# Patient Record
Sex: MALE | Race: BLACK OR AFRICAN AMERICAN | URBAN - METROPOLITAN AREA
[De-identification: names, ages, dates, MRNs, and addresses within clinical notes are randomized per-mention and may not be internally consistent; named-entity substitution may affect disease eponyms.]

---

## 2018-04-25 ENCOUNTER — HISTORICAL (OUTPATIENT)
Dept: RADIOLOGY | Facility: HOSPITAL | Age: 23
End: 2018-04-25

## 2018-05-01 ENCOUNTER — HISTORICAL (OUTPATIENT)
Dept: RADIOLOGY | Facility: HOSPITAL | Age: 23
End: 2018-05-01

## 2018-09-13 ENCOUNTER — HISTORICAL (OUTPATIENT)
Dept: RADIOLOGY | Facility: HOSPITAL | Age: 23
End: 2018-09-13

## 2018-12-20 ENCOUNTER — HISTORICAL (OUTPATIENT)
Dept: PREADMISSION TESTING | Facility: HOSPITAL | Age: 23
End: 2018-12-20

## 2018-12-20 ENCOUNTER — HISTORICAL (OUTPATIENT)
Dept: ADMINISTRATIVE | Facility: HOSPITAL | Age: 23
End: 2018-12-20

## 2018-12-20 LAB
ABS NEUT (OLG): 3.41 X10(3)/MCL (ref 2.1–9.2)
BASOPHILS # BLD AUTO: 0.1 X10(3)/MCL (ref 0–0.2)
BASOPHILS NFR BLD AUTO: 1 %
BUN SERPL-MCNC: 11 MG/DL (ref 7–18)
CALCIUM SERPL-MCNC: 9.3 MG/DL (ref 8.5–10.1)
CHLORIDE SERPL-SCNC: 104 MMOL/L (ref 98–107)
CO2 SERPL-SCNC: 28 MMOL/L (ref 21–32)
CREAT SERPL-MCNC: 1.13 MG/DL (ref 0.7–1.3)
CREAT/UREA NIT SERPL: 9.7
EOSINOPHIL # BLD AUTO: 0.2 X10(3)/MCL (ref 0–0.9)
EOSINOPHIL NFR BLD AUTO: 4 %
ERYTHROCYTE [DISTWIDTH] IN BLOOD BY AUTOMATED COUNT: 12 % (ref 11.5–17)
GLUCOSE SERPL-MCNC: 76 MG/DL (ref 74–106)
HCT VFR BLD AUTO: 44.7 % (ref 42–52)
HGB BLD-MCNC: 14.7 GM/DL (ref 14–18)
LYMPHOCYTES # BLD AUTO: 1.8 X10(3)/MCL (ref 0.6–4.6)
LYMPHOCYTES NFR BLD AUTO: 30 %
MCH RBC QN AUTO: 30.9 PG (ref 27–31)
MCHC RBC AUTO-ENTMCNC: 32.9 GM/DL (ref 33–36)
MCV RBC AUTO: 93.9 FL (ref 80–94)
MONOCYTES # BLD AUTO: 0.5 X10(3)/MCL (ref 0.1–1.3)
MONOCYTES NFR BLD AUTO: 8 %
NEUTROPHILS # BLD AUTO: 3.41 X10(3)/MCL (ref 2.1–9.2)
NEUTROPHILS NFR BLD AUTO: 57 %
PLATELET # BLD AUTO: 310 X10(3)/MCL (ref 130–400)
PMV BLD AUTO: 9.2 FL (ref 9.4–12.4)
POTASSIUM SERPL-SCNC: 4.3 MMOL/L (ref 3.5–5.1)
RBC # BLD AUTO: 4.76 X10(6)/MCL (ref 4.7–6.1)
SODIUM SERPL-SCNC: 142 MMOL/L (ref 136–145)
WBC # SPEC AUTO: 6 X10(3)/MCL (ref 4.5–11.5)

## 2018-12-21 ENCOUNTER — HISTORICAL (OUTPATIENT)
Dept: SURGERY | Facility: HOSPITAL | Age: 23
End: 2018-12-21

## 2022-04-11 ENCOUNTER — HISTORICAL (OUTPATIENT)
Dept: ADMINISTRATIVE | Facility: HOSPITAL | Age: 27
End: 2022-04-11

## 2022-04-27 VITALS
DIASTOLIC BLOOD PRESSURE: 75 MMHG | WEIGHT: 206.81 LBS | SYSTOLIC BLOOD PRESSURE: 117 MMHG | HEIGHT: 70 IN | BODY MASS INDEX: 29.61 KG/M2

## 2022-04-30 NOTE — OP NOTE
DATE OF SURGERY:    12/21/2018    SURGEON:  Bar Lee MD    PREOPERATIVE DIAGNOSIS:  Left knee lateral meniscus tear.    POSTOPERATIVE DIAGNOSIS:  Left knee lateral meniscus tear.    PROCEDURE:    1. Left knee arthroscopy with lateral meniscectomy, anterior horn, left knee.  2. Limited debridement left knee arthroscopy redundant fat pad.    ANESTHESIA:  LMA.    IV FLUIDS:  Per Anesthesia.    ESTIMATED BLOOD LOSS:  Less than 5 cc.    COUNTS:  Correct.    COMPLICATIONS:  None; stable to PACU.    INDICATION FOR PROCEDURE:  Mr. Blancas is a 23-year-old male with continued pain and loss of motion of his left knee.  He has failed multiple conservative treatments.  The patient had 2 MRIs demonstrating the above findings.  The risks, benefits and alternatives were discussed with the patient in detail.  The risks included, but were not limited to, pain, bleeding, infection, damage to blood vessels or nerves, heart attack, stroke, death, need for operation, continued pain, loss of motion, scar tissue, recurrent tear, posttraumatic arthritis, need for additional surgery, continued pain and loss of motion.  The patient understood these risks and wanted to proceed with surgical intervention.  Informed consent was obtained.    PROCEDURE IN DETAIL:  The patient was found in preoperative holding by Anesthesia and found fit for surgery.  He was taken to the operating room and placed on the operating table in supine position.  All bony prominences were well padded.  Timeout was called to identify correct patient, correct procedure and correct site, and all were in agreement.  The patient underwent LMA anesthesia without complications.  He was then prepped and draped in normal sterile fashion leaving the left lower extremity exposed for surgery.  Well-padded tourniquet was placed on the left upper thigh.  Approximate tourniquet time was 20 minutes at 300.  He received his preop antibiotics.  After exsanguination of left  lower extremity, tourniquet was inflated.  Anterolateral portal was then made.  Through a 1 cm incision, camera was introduced in suprapatellar pouch.  Next, the medial gutter was inspected with no loose bodies.  The patella was tracking appropriately.  There was no obvious cartilage change of the patellofemoral joint.  Camera was introduced the medial compartment.  Anteromedial portal was then made through 1 cm incision.  Next, probing the medial meniscus demonstrated some mild degeneration, otherwise no tear.  It was stable to stressing.  The probe demonstrated no tears as well.  His cartilage looked appropriate.  Camera was introduced in intercondylar notch where patient had a redundant and scarred in fat pad.  With use of 3.5 shaver, the patient underwent a debridement of this fat pad and scar tissue.  The ACL and PCL were inspected and found to be intact.  Camera was introduced into the lateral compartment where patient had a small anterior horn lateral meniscus tear.  It was unstable to the probed.  With use of 3.5 shaver, patient underwent debridement of the anterior horn of lateral meniscus.  The remaining lateral meniscus was then probed and found to be stable without any signs of additional tear or instability.  He had no gross changes of his cartilage of the lateral compartment as well.  After this was done, camera was introduced back in suprapatellar pouch.  All excess fluid was removed.  Tourniquet was released and hemostasis achieved.  Copious irrigation was used to wash the 2 incisions.  Incisions were then closed with 2-0 nylon sutures in a standard horizontal mattress configuration.  Xeroform, 4 x 4's, soft tissue dressing and Ace wrap were placed on the left lower extremity.  He was awoken by Anesthesia and brought to PACU in stable condition.        ______________________________  MD AUGUSTINE Johnson/LEROY  DD:  12/25/2018  Time:  12:54PM  DT:  12/25/2018  Time:  02:06PM  Job #:  [id number]

## 2022-05-04 NOTE — HISTORICAL [facility]ER
This is a historical note converted from [place]. Formatting and pictures may have been removed.  Please reference [employer] for original formatting and attached multimedia. TAKE THE FOLLOWING MEDICATIONS MORNING OF SURGERY:  ?   bp meds  ?   DO NOT take the following medications morning of surgery:  ?   [ ]  ?  STOP the following medications by:  ?  [ ]  ?Medication List  ?? Active Medications  ?? ? ??Prescribed  ?? ? ? ? ? dicyclomine: 20 mg, 2 cap(s), Oral, QID, 30 to 60 minutes before  ?? ? ? ? ? ? meals, PRN: as needed for abdominal cramps and spasms, 20 cap(s), 0  ?? ? ? ? ? ? Refill(s).  ?? ? ? ? ? loratadine: 10 mg, 1 tab(s), Oral, Daily, 30 tab(s), 3 Refill(s).  ?? ? ? ? ? montelukast: 10 mg, 1 tab(s), Oral, Daily, 30 tab(s), 3 Refill(s).  ?? ? ? ? ? ondansetron: 4 mg, 1 tab(s), Oral, TID, allow tablet to dissolve on  ?? ? ? ? ? ? tongue, PRN: as needed for nausea/vomiting, 12 tab(s), 0 Refill(s).  ?? ? ? ? ? sucralfate: 1 gm, 1 tab(s), Oral, QIDACHS, for 30 day(s), on an empty  ?? ? ? ? ? ? stomach, 120 tab(s), 3 Refill(s).  ?? ? ??Documented  ?? ? ? ? ? azithromycin: 250 mg, 1 tab(s), Oral, As Directed.  ?? ? ? ? ? epinephrine: .  ?? ? ? ? ? ergocalciferol: 81798 IntUnit, 1 cap(s), Oral, qWeek.  ?? ? ? ? ? escitalopram: 10 mg, 1 tab(s), Oral, Daily, 30 tab(s), 0 Refill(s).  ?? ? ? ? ? multivitamin with minerals: 1 tab(s), Oral, Daily, 0 Refill(s).  ?? Medications Inactivated in the Last 72 Hours  ?? ? ? venlafaxine: .  ?

## 2022-05-04 NOTE — HISTORICAL [facility]ER
This is a historical note converted from [employer]. Formatting and pictures may have been removed.  Please reference [employer] for original formatting and attached multimedia. Chief Complaint  Pre Op left knee-also c/o right knee pain  History of Present Illness  Mr. Blancas presents today preoperative evaluation for left knee arthroscopy with lateral meniscectomy. I reviewed the indications for surgery. The risks and benefits of the proposed and alternative treatments were discussed with the patient. Questions pertinent to the procedure were solicited and answered. No assurances were given. Informed consent was obtained. The patient expressed good understanding and wished to proceed with scheduling the procedure.  Review of Systems  Constitutional: No fever, weakness, or fatigue.  Ear/Nose/Mouth/Throat: No nasal congestion or sore throat.  Respiratory: No shortness of breath or cough.  Cardiovascular: No chest pain, palpitations, or peripheral edema.  Gastrointestinal: No nausea, vomiting, or abdominal pain.  Genitourinary: No dysuria.  Musculoskeletal:?Left knee pain swelling loss of motion  Integumentary: Negative.  Physical Exam  Vitals & Measurements  HR:?88(Peripheral)? BP:?117/75? WT:?93.44?kg? WT:?93.44?kg?  Appearance: No distress, good color on room air. Alert and cooperative.  HEENT: Normocephalic. PERRLA EOM intact. Nose and throat clear.  Lungs: Clear to auscultation and percussion.  Heart: Regular rate and rhythm without murmurs or gallops.  Abdomen: Soft non tender. Bowel sounds positive. No rebound tenderness.  Extremities:?Left lower extremity compartments are soft and warm. ?Skin is intact. ?He remains to be point tender along the lateral joint line positive lateral [place]. ?He is using his brace he does walk with a slight antalgic gait?he has 5 degrees short of full extension he is able to flex to 150 degrees?he is otherwise stable to stressing he is neurovascular intact distally.  Skin: No rashes or  open wounds.  Neuro: No focal weakness, loss of sensation or incoordination. Gait and station are normal.  DTRs equal.  Other:  Assessment/Plan  1.?Tear of lateral meniscus of left knee?S83.282A  Plan for left knee arthroscopy with lateral meniscectomy at [facility]. The patient has been given preoperative instructions and prescriptions for post-operative medication. Post-operative appointment is scheduled for 2 weeks.  ?  2.?Left knee pain?M25.562  ?  3.?Right knee pain?M25.561,? Right knee pain?M25.561  Ordered:  XR Knee Right 4 or More Views, Routine, 12/20/18 11:21:00 CST, Pain, None, Ambulatory, Rad Type, Right knee pain, Not Scheduled, 12/20/18 11:21:00 CST  ?   Problem List/Past Medical History  Ongoing  Abnormality of globulin(  Confirmed  )  Allergic rhinitis(  Confirmed  )  Cigarette smoker(  Confirmed  )  Classic migraine(  Confirmed  )  Depression  Emotional stress reaction(  Confirmed  )  Lack of sensation(  Confirmed  )  Liver enzyme elevation(  Confirmed  )  Supraspinatus tendonitis(  Confirmed  )  Tinnitus  Torn meniscus  Historical  Pervasive developmental disorder, residual(  Confirmed  )  Medications  Inpatient  No active inpatient medications  Home  AZITHROMYCIN 250MG TABLETS 6-LEVI, 250 mg= 1 tab(s), Oral, As Directed  Bentyl 10 mg oral capsule, 20 mg= 2 cap(s), Oral, QID, PRN  Carafate 1 g oral tablet, 1 gm= 1 tab(s), Oral, QIDACHS, 3 refills  Daily Multi oral tablet, 1 tab(s), Oral, Daily  EPIPEN 2-LEVI INJ 0.3MG  escitalopram 10 mg oral tablet, 10 mg= 1 tab(s), Oral, Daily  loratadine 10 mg oral tablet, 10 mg= 1 tab(s), Oral, Daily, 3 refills  Singulair 10 mg oral TABLET, 10 mg= 1 tab(s), Oral, Daily, 3 refills  VITAMIN D CAP 02456CAV, 29877 IntUnit= 1 cap(s), Oral, qWeek  Zofran ODT 4 mg oral tablet, disintegrating, 4 mg= 1 tab(s), Oral, TID, PRN  Allergies  doxycycline?(Chest pain)  Social History  Alcohol - Medium Risk, 02/18/2015  Current, Liquor, 1-2 times per month,  02/18/2015  Employment/School  Employed, Work/School description: walkby, [employer] heavy machinery repair., 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 Previous treatment: None., 12/13/2018  Family History  CAD (coronary artery disease).: Brother.  COPD (chronic obstructive pulmonary disease).: Grandfather.  Congestive heart disease.: Grandfather.  Diabetes mellitus type 2: Grandfather and Grandmother.  Hypertension.: Mother.  Schizophrenia.: Father.  Immunizations  Vaccine Date Status   influenza virus vaccine, inactivated 12/15/2014 Recorded